# Patient Record
Sex: MALE | Race: WHITE | Employment: OTHER | URBAN - METROPOLITAN AREA
[De-identification: names, ages, dates, MRNs, and addresses within clinical notes are randomized per-mention and may not be internally consistent; named-entity substitution may affect disease eponyms.]

---

## 2018-02-16 ENCOUNTER — OP HISTORICAL/CONVERTED ENCOUNTER (OUTPATIENT)
Dept: OTHER | Age: 78
End: 2018-02-16

## 2020-12-14 VITALS
TEMPERATURE: 98.3 F | WEIGHT: 98 LBS | BODY MASS INDEX: 13.27 KG/M2 | DIASTOLIC BLOOD PRESSURE: 68 MMHG | HEART RATE: 82 BPM | OXYGEN SATURATION: 98 % | HEIGHT: 72 IN | SYSTOLIC BLOOD PRESSURE: 91 MMHG

## 2020-12-14 PROBLEM — E03.9 ACQUIRED HYPOTHYROIDISM: Status: ACTIVE | Noted: 2020-12-14

## 2020-12-14 PROBLEM — J45.909 ASTHMA: Status: ACTIVE | Noted: 2020-12-14

## 2020-12-14 PROBLEM — I25.9 CHRONIC ISCHEMIC HEART DISEASE: Status: ACTIVE | Noted: 2020-12-14

## 2020-12-14 PROBLEM — I10 ESSENTIAL HYPERTENSION: Status: ACTIVE | Noted: 2020-12-14

## 2020-12-14 PROBLEM — I71.40 ABDOMINAL AORTIC ANEURYSM (AAA): Status: ACTIVE | Noted: 2020-12-14

## 2020-12-14 RX ORDER — LEVOTHYROXINE SODIUM 25 UG/1
TABLET ORAL
COMMUNITY

## 2020-12-14 RX ORDER — FLUTICASONE PROPIONATE AND SALMETEROL 250; 50 UG/1; UG/1
1 POWDER RESPIRATORY (INHALATION) EVERY 12 HOURS
COMMUNITY

## 2020-12-14 RX ORDER — MONTELUKAST SODIUM 10 MG/1
10 TABLET ORAL DAILY
COMMUNITY

## 2020-12-14 RX ORDER — ALBUTEROL SULFATE 90 UG/1
AEROSOL, METERED RESPIRATORY (INHALATION)
COMMUNITY

## 2020-12-14 RX ORDER — LATANOPROST 50 UG/ML
1 SOLUTION/ DROPS OPHTHALMIC
COMMUNITY

## 2020-12-14 RX ORDER — LISINOPRIL 5 MG/1
TABLET ORAL DAILY
COMMUNITY

## 2020-12-14 RX ORDER — TIOTROPIUM BROMIDE 18 UG/1
1 CAPSULE ORAL; RESPIRATORY (INHALATION) DAILY
COMMUNITY

## 2020-12-14 RX ORDER — ALBUTEROL SULFATE 2.5 MG/.5ML
SOLUTION RESPIRATORY (INHALATION) ONCE
COMMUNITY

## 2020-12-14 RX ORDER — ASPIRIN 81 MG/1
TABLET ORAL DAILY
COMMUNITY

## 2020-12-14 RX ORDER — FLUTICASONE PROPIONATE 110 UG/1
AEROSOL, METERED RESPIRATORY (INHALATION)
COMMUNITY

## 2020-12-14 RX ORDER — SIMVASTATIN 40 MG/1
TABLET, FILM COATED ORAL
COMMUNITY

## 2021-03-10 ENCOUNTER — OFFICE VISIT (OUTPATIENT)
Dept: SURGERY | Age: 81
End: 2021-03-10
Payer: MEDICARE

## 2021-03-10 VITALS
HEART RATE: 100 BPM | WEIGHT: 152 LBS | HEIGHT: 72 IN | BODY MASS INDEX: 20.59 KG/M2 | OXYGEN SATURATION: 98 % | DIASTOLIC BLOOD PRESSURE: 78 MMHG | TEMPERATURE: 99.1 F | SYSTOLIC BLOOD PRESSURE: 139 MMHG

## 2021-03-10 DIAGNOSIS — I71.40 ABDOMINAL AORTIC ANEURYSM (AAA) WITHOUT RUPTURE: Primary | ICD-10-CM

## 2021-03-10 PROCEDURE — 99213 OFFICE O/P EST LOW 20 MIN: CPT | Performed by: SURGERY

## 2021-03-10 NOTE — PROGRESS NOTES
VASCULAR FOLLOW UP      Subjective:   CHIEF COMPLAINTS:  Follow-up endograft repair of the AAA. PRESENTATION OF ILLNESS:    Mr. Emily Brown is doing well. He has no back pain or abdominal pain he had a previous endograft placement done for AAA. He is here today follow-up. He had ultrasonogram of the abdomen done which shows proximal aorta shows 25 x 29 mm.,  Mid aorta shows 22 x 27 mm. Distal aorta shows 19 x 20 mm. Past Medical History:   Diagnosis Date    Aneurysm (arteriovenous) of coronary vessels     AAA S/P Evar    Asthma     CAD (coronary artery disease)     Hx of long term use of blood thinners     Asprin 81mg    Hypercholesterolemia     Lung disorder     Blebs: Right Lung    Thyroid disease       Past Surgical History:   Procedure Laterality Date    HX ORTHOPAEDIC      Open femoral art expos    [facility] ABDOMEN SURGERY PROC UNLISTED      Endovascular repair of abdominal aortic aneurysm    [facility] CARDIAC SURG PROCEDURE UNLIST      Cardiovascular surgical procedure     No family history on file. Social History     Tobacco Use    Smoking status: Not on file   Substance Use Topics    Alcohol use: Not on file       Prior to Admission medications    Medication Sig Start Date End Date Taking? Authorizing Provider   fluticasone propion-salmeteroL (Advair Diskus) 250-50 mcg/dose diskus inhaler Take 1 Puff by inhalation every twelve (12) hours. Yes Provider, Historical   albuterol sulfate (PROVENTIL;VENTOLIN) 2.5 mg/0.5 mL nebu nebulizer solution by Nebulization route once. Yes Provider, Historical   aspirin delayed-release 81 mg tablet Take  by mouth daily. Yes Provider, Historical   latanoprost (XALATAN) 0.005 % ophthalmic solution Administer 1 Drop to both eyes nightly. Yes Provider, Historical   levothyroxine (SYNTHROID) 25 mcg tablet Take  by mouth Daily (before breakfast). Yes Provider, Historical   lisinopriL (PRINIVIL, ZESTRIL) 5 mg tablet Take  by mouth daily.    Yes Provider, Historical montelukast (SINGULAIR) 10 mg tablet Take 10 mg by mouth daily. Yes Provider, Historical   albuterol (ProAir HFA) 90 mcg/actuation inhaler Take  by inhalation. Yes Provider, Historical   simvastatin (ZOCOR) 40 mg tablet Take  by mouth nightly. Yes Provider, Historical   tiotropium (Spiriva with HandiHaler) 18 mcg inhalation capsule Take 1 Cap by inhalation daily. Yes Provider, Historical   tamsulosin HCl (TAMSULOSIN PO) Take  by mouth. Yes Provider, Historical   fluticasone propionate (Flovent HFA) 110 mcg/actuation inhaler Take  by inhalation. Provider, Historical     No Known Allergies     Review of Systems:  I reviewed the rest of organ systems personally and they were negative signed by Dr. Frances Driver    Objective:     Visit Vitals  /78 (BP 1 Location: Right arm, BP Patient Position: Sitting, BP Cuff Size: Adult)   Pulse 100   Temp 99.1 °F (37.3 °C) (Temporal)   Ht 6' (1.829 m)   Wt 152 lb (68.9 kg)   SpO2 98% Comment: on O2/2liters   BMI 20.61 kg/m²     VITAL SIGNS REVIEWED. Physical Exam:  Patient is well-nourished pleasant in conversation is appropriate. Head and neck examination atraumatic, normocephalic. Gaze appropriate. Conversation appropriate. Neck examination shows supple. No mass. No obvious carotid bruit. Chest examination shows lungs are clear bilaterally well-expanded, no crackles or wheezes. Cardiovascular system regular rate, no obvious murmur. Skin warm to touch  and moist, no skin lesions. Abdomen is soft ,not tender or distended bowel sounds present. No palpable mass. Neurological examinations, no focal neuro deficits moving all 4 extremities. Cranial nerves intact. Sensation is intact as well. Hematologic: No obvious bruise or swelling or obvious lymphadenopathy. Psychosocial: Appropriate. Has good effect. Musculoskeletal system: No muscle wasting, appropriate movements upper and lower extremity.   Vascular examination: Abdomen is soft there is no palpable mass.  Bilateral femoral artery has 2+ palpable pulse. Data Review:   No visits with results within 1 Month(s) from this visit. Latest known visit with results is:   No results found for any previous visit. Assessment:     Problem List Items Addressed This Visit        Circulatory    Abdominal aortic aneurysm (AAA) ([address] - Primary              Plan:     Ultrasonogram indicates possibly slightly bigger aneurysm sac. We will check patient's BUN/creatinine laboratory if that looks okay we will get CT angiogram of the abdomen pelvis for further follow-up on this findings.   And I will give the patient personal call after BUN/creatinine level has been checked whether we should repeat the ultrasound in 1to 10month old CT scan of the abdomen pelvis with angiogram.      Norberto Plunkett MD

## 2021-03-22 ENCOUNTER — TELEPHONE (OUTPATIENT)
Dept: SURGERY | Age: 81
End: 2021-03-22

## 2021-03-22 NOTE — TELEPHONE ENCOUNTER
Mr. Cody Milligan has called to see if Dr. Indigo Payne got the lab results back from [facility]?

## 2021-03-23 NOTE — TELEPHONE ENCOUNTER
Left detailed message letting the patient know we had received his results from [facility] and they were scanned into the computer for Dr. iSmin Garcias to review.

## 2022-01-10 ENCOUNTER — TELEPHONE (OUTPATIENT)
Dept: SURGERY | Age: 82
End: 2022-01-10

## 2022-01-10 NOTE — TELEPHONE ENCOUNTER
Patient called stated next appointment with Dr Ezequiel Che is 03/14/2022 and needs an order for his ultrasound for AAA. Stated this is done once a year before each visit.  Any questions please call patient [phone]

## 2022-01-14 DIAGNOSIS — I71.40 ABDOMINAL AORTIC ANEURYSM (AAA) WITHOUT RUPTURE: Primary | ICD-10-CM

## 2022-01-14 NOTE — TELEPHONE ENCOUNTER
Called patient to let him know that Dr. Clay Ramos put the order in for the ultrasound. Patient states he gets that done at [facility]. Offered to leave the order at the  for  or to mail it. Patient is going to pick it up from the office.

## 2022-02-18 ENCOUNTER — TELEPHONE (OUTPATIENT)
Dept: SURGERY | Age: 82
End: 2022-02-18

## 2022-02-18 NOTE — TELEPHONE ENCOUNTER
Patient called would like to know if we received the ultrasound from [facility] [address]. Patients next appointment with Dr Ezequiel Che is 03/14/2022.  Please call patient [phone]

## 2022-03-14 ENCOUNTER — OFFICE VISIT (OUTPATIENT)
Dept: SURGERY | Age: 82
End: 2022-03-14
Payer: MEDICARE

## 2022-03-14 DIAGNOSIS — I71.40 ABDOMINAL AORTIC ANEURYSM (AAA) WITHOUT RUPTURE: Primary | ICD-10-CM

## 2022-03-14 PROCEDURE — G8510 SCR DEP NEG, NO PLAN REQD: HCPCS | Performed by: SURGERY

## 2022-03-14 PROCEDURE — 1101F PT FALLS ASSESS-DOCD LE1/YR: CPT | Performed by: SURGERY

## 2022-03-14 PROCEDURE — G8427 DOCREV CUR MEDS BY ELIG CLIN: HCPCS | Performed by: SURGERY

## 2022-03-14 PROCEDURE — G8421 BMI NOT CALCULATED: HCPCS | Performed by: SURGERY

## 2022-03-14 PROCEDURE — 99213 OFFICE O/P EST LOW 20 MIN: CPT | Performed by: SURGERY

## 2022-03-14 PROCEDURE — G8756 NO BP MEASURE DOC: HCPCS | Performed by: SURGERY

## 2022-03-14 PROCEDURE — G8536 NO DOC ELDER MAL SCRN: HCPCS | Performed by: SURGERY

## 2022-03-14 NOTE — PROGRESS NOTES
1. \"Have you been to the ER, urgent care clinic since your last visit? Hospitalized since your last visit? \" Yes When: Feb.26.2022 [facility]: [facility] Reason for visit: COPD flare up    2. \"Have you seen or consulted any other health care providers outside of the [address] [place] [address] since your last visit? \" Yes When: [facility].[id number] Where: [facility] Reason for visit: COPD flare up     3. For patients aged 39-70: Has the patient had a colonoscopy / FIT/ Cologuard? No      If the patient is female:    4. For patients aged 41-77: Has the patient had a mammogram within the past 2 years? NA - based on age or sex      11. For patients aged 21-65: Has the patient had a pap smear? NA - based on age or sex     There were no vitals taken for this visit.     Chief Complaint   Patient presents with    Follow-up     abdominal stent

## 2022-03-17 NOTE — PROGRESS NOTES
VASCULAR FOLLOW UP      Subjective:   CHIEF COMPLAINTS:  Follow-up AAA  PRESENTATION OF ILLNESS:  Enoch Casillas is a [de-identified] y.o. very pleasant man is here today 1 year after ultrasound for his AAA. Patient has a less than 3 cm infrarenal AAA that was found ultrasound. Patient currently asymptomatic. Patient denies abdominal pain or back pain or claudication symptoms. Past Medical History:   Diagnosis Date    Aneurysm (arteriovenous) of coronary vessels     AAA S/P Evar    Asthma     CAD (coronary artery disease)     Hx of long term use of blood thinners     Asprin 81mg    Hypercholesterolemia     Lung disorder     Blebs: Right Lung    Thyroid disease       Past Surgical History:   Procedure Laterality Date    HX ORTHOPAEDIC      Open femoral art expos    [place] ABDOMEN SURGERY PROC UNLISTED      Endovascular repair of abdominal aortic aneurysm    [place] CARDIAC SURG PROCEDURE UNLIST      Cardiovascular surgical procedure     No family history on file. Social History     Tobacco Use    Smoking status: Not on file    Smokeless tobacco: Not on file   Substance Use Topics    Alcohol use: Not on file       Prior to Admission medications    Medication Sig Start Date End Date Taking? Authorizing Provider   fluticasone propion-salmeteroL (Advair Diskus) 250-50 mcg/dose diskus inhaler Take 1 Puff by inhalation every twelve (12) hours. Yes Provider, Historical   albuterol sulfate (PROVENTIL;VENTOLIN) 2.5 mg/0.5 mL nebu nebulizer solution by Nebulization route once. Yes Provider, Historical   aspirin delayed-release 81 mg tablet Take  by mouth daily. Yes Provider, Historical   latanoprost (XALATAN) 0.005 % ophthalmic solution Administer 1 Drop to both eyes nightly. Yes Provider, Historical   levothyroxine (SYNTHROID) 25 mcg tablet Take  by mouth Daily (before breakfast). Yes Provider, Historical   lisinopriL (PRINIVIL, ZESTRIL) 5 mg tablet Take  by mouth daily.    Yes Provider, Historical   montelukast (SINGULAIR) 10 mg tablet Take 10 mg by mouth daily. Yes Provider, Historical   albuterol (ProAir HFA) 90 mcg/actuation inhaler Take  by inhalation. Yes Provider, Historical   simvastatin (ZOCOR) 40 mg tablet Take  by mouth nightly. Yes Provider, Historical   tiotropium (Spiriva with HandiHaler) 18 mcg inhalation capsule Take 1 Capsule by inhalation daily. Yes Provider, Historical   fluticasone propionate (Flovent HFA) 110 mcg/actuation inhaler Take  by inhalation. Yes Provider, Historical   tamsulosin HCl (TAMSULOSIN PO) Take  by mouth. Patient not taking: Reported on 3/14/2022    Provider, Historical     No Known Allergies     Review of Systems:  I reviewed the rest of organ systems personally and they were negative signed by Dr. Copeland Duty    Objective: There were no vitals taken for this visit. VITAL SIGNS REVIEWED. Physical Exam:  Patient is well-nourished pleasant in conversation is appropriate. Head and neck examination atraumatic, normocephalic. Gaze appropriate. Conversation appropriate. Neck examination shows supple. No mass. No obvious carotid bruit. Chest examination shows lungs are clear bilaterally well-expanded, no crackles or wheezes. Cardiovascular system regular rate, no obvious murmur. Skin warm to touch  and moist, no skin lesions. Abdomen is soft ,not tender or distended bowel sounds present. No palpable mass. Neurological examinations, no focal neuro deficits moving all 4 extremities. Cranial nerves intact. Sensation is intact as well. Hematologic: No obvious bruise or swelling or obvious lymphadenopathy. Psychosocial: Appropriate. Has good effect. Musculoskeletal system: No muscle wasting, appropriate movements upper and lower extremity. Vascular examination: Patient has palpable femoral pulses bilaterally and well perfused distally. Data Review:   No visits with results within 1 Month(s) from this visit.    Latest known visit with results is:   No results found for any previous visit. Assessment:     Problem List Items Addressed This Visit        Circulatory    Abdominal aortic aneurysm (AAA) ([address] - Primary    Relevant Orders    US ABD LTD              Plan:     Patient was informed recent ultrasonographic examination, aneurysm size less than 3 cm. Patient will be rearranged for another ultrasound in 1 year of the abdomen to look into AAA at the [facility] nearby his home. And then patient will follow up with me next year.         Leola Morris MD

## 2022-03-18 PROBLEM — I25.9 CHRONIC ISCHEMIC HEART DISEASE: Status: ACTIVE | Noted: 2020-12-14

## 2022-03-19 PROBLEM — E03.9 ACQUIRED HYPOTHYROIDISM: Status: ACTIVE | Noted: 2020-12-14

## 2022-03-19 PROBLEM — I10 ESSENTIAL HYPERTENSION: Status: ACTIVE | Noted: 2020-12-14

## 2022-03-19 PROBLEM — J45.909 ASTHMA: Status: ACTIVE | Noted: 2020-12-14

## 2022-03-20 PROBLEM — I71.40 ABDOMINAL AORTIC ANEURYSM (AAA) (HCC): Status: ACTIVE | Noted: 2020-12-14

## 2023-01-03 ENCOUNTER — TELEPHONE (OUTPATIENT)
Dept: SURGERY | Age: 83
End: 2023-01-03

## 2023-01-03 DIAGNOSIS — I71.40 ABDOMINAL AORTIC ANEURYSM (AAA) WITHOUT RUPTURE, UNSPECIFIED PART: Primary | ICD-10-CM

## 2023-01-03 NOTE — TELEPHONE ENCOUNTER
Patient called in stating that he needs to have ultrasound done before appointment. He wants to know if it could be sent to him so he can take it to [facility] and schedule an appointment.  Please call back when able [phone]

## 2023-03-24 ENCOUNTER — TELEPHONE (OUTPATIENT)
Dept: SURGERY | Age: 83
End: 2023-03-24

## 2023-03-24 ENCOUNTER — DOCUMENTATION ONLY (OUTPATIENT)
Dept: SURGERY | Age: 83
End: 2023-03-24

## 2023-03-24 NOTE — TELEPHONE ENCOUNTER
Spoke to patient and let him know I spoke with dr Terence Segovia and he is going to call him with the results there is no appointment needed. He asked if he should wait at home I let him know I wasn't sure when he would be getting hold of patient he stated his understanding. He is going to be out and about today.  I let patient know Dr Riley Garcia will leave his name and number to call back

## 2023-03-24 NOTE — TELEPHONE ENCOUNTER
David Blackwell called this morning regarding his appointment, he said nurse was supposed to call him to see if he needed to reschedule with Dr Chris Edouard or wait, please call when able [phone]

## 2023-03-27 DIAGNOSIS — I71.40 ABDOMINAL AORTIC ANEURYSM (AAA) WITHOUT RUPTURE, UNSPECIFIED PART (HCC): Primary | ICD-10-CM

## 2023-04-24 DIAGNOSIS — I71.40 ABDOMINAL AORTIC ANEURYSM (AAA) WITHOUT RUPTURE, UNSPECIFIED PART (HCC): Primary | ICD-10-CM

## 2023-05-23 RX ORDER — TIOTROPIUM BROMIDE 18 UG/1
1 CAPSULE ORAL; RESPIRATORY (INHALATION) DAILY
COMMUNITY

## 2023-05-23 RX ORDER — LISINOPRIL 5 MG/1
TABLET ORAL DAILY
COMMUNITY

## 2023-05-23 RX ORDER — MONTELUKAST SODIUM 10 MG/1
10 TABLET ORAL DAILY
COMMUNITY

## 2023-05-23 RX ORDER — FLUTICASONE PROPIONATE 110 UG/1
AEROSOL, METERED RESPIRATORY (INHALATION)
COMMUNITY

## 2023-05-23 RX ORDER — ALBUTEROL SULFATE 90 UG/1
AEROSOL, METERED RESPIRATORY (INHALATION)
COMMUNITY

## 2023-05-23 RX ORDER — FLUTICASONE PROPIONATE AND SALMETEROL 250; 50 UG/1; UG/1
1 POWDER RESPIRATORY (INHALATION) EVERY 12 HOURS
COMMUNITY

## 2023-05-23 RX ORDER — ASPIRIN 81 MG/1
TABLET ORAL DAILY
COMMUNITY

## 2023-05-23 RX ORDER — SIMVASTATIN 40 MG
TABLET ORAL
COMMUNITY

## 2023-05-23 RX ORDER — LEVOTHYROXINE SODIUM 0.03 MG/1
TABLET ORAL
COMMUNITY

## 2023-05-23 RX ORDER — LATANOPROST 50 UG/ML
1 SOLUTION/ DROPS OPHTHALMIC
COMMUNITY

## 2024-01-08 ENCOUNTER — TELEPHONE (OUTPATIENT)
Age: 84
End: 2024-01-08

## 2024-01-09 NOTE — TELEPHONE ENCOUNTER
Spoke with patient informing him the ultrasound is already ordered for February . Patient wants copy of order mailed to him. Confirmed address on file.

## 2024-02-02 ENCOUNTER — TELEPHONE (OUTPATIENT)
Age: 84
End: 2024-02-02

## 2024-02-26 ENCOUNTER — HOSPITAL ENCOUNTER (OUTPATIENT)
Facility: HOSPITAL | Age: 84
Discharge: HOME | End: 2024-02-29
Attending: SURGERY
Payer: MEDICARE

## 2024-02-26 DIAGNOSIS — I71.40 ABDOMINAL AORTIC ANEURYSM (AAA) WITHOUT RUPTURE, UNSPECIFIED PART (HCC): ICD-10-CM

## 2024-02-26 PROCEDURE — 76775 US EXAM ABDO BACK WALL LIM: CPT

## 2024-03-04 ENCOUNTER — TELEPHONE (OUTPATIENT)
Age: 84
End: 2024-03-04

## 2024-03-04 NOTE — TELEPHONE ENCOUNTER
Called patient with two identifiers and told him that Dr. Mooney would call him tomorrow and give him US results.

## 2024-03-04 NOTE — TELEPHONE ENCOUNTER
Patient called stating that did ultrasound last Monday on 2/26/24 and Patient wants to know if can go over results in person or if needs to keep appt for 3/14/24 with him to go over results in person. Advised that would send message to medical staff and that Dr. Mooney Is currently out of office for family emergency and not sure that he would be able to go over results over the phone but that would have medical staff verify and let him know.     Patient call back number- [phone]

## 2024-03-05 NOTE — TELEPHONE ENCOUNTER
Called patient with two identifiers and told him that Dr. Mooney will call him today with US results

## 2025-04-11 ENCOUNTER — CARE COORDINATION (OUTPATIENT)
Dept: OTHER | Facility: CLINIC | Age: 85
End: 2025-04-11

## 2025-04-11 NOTE — CARE COORDINATION
Care Transitions Note      CTN received patient today via population health report. Patient was admitted to [facility] from his [facility]. CTN called [facility] to confirm patient was still admitted and informed by the  that patient has . CTN will sign off.           Heather ALVARADO RN  Ambulatory    [phone]  [email or web address]